# Patient Record
Sex: FEMALE | Race: WHITE | Employment: FULL TIME
[De-identification: names, ages, dates, MRNs, and addresses within clinical notes are randomized per-mention and may not be internally consistent; named-entity substitution may affect disease eponyms.]

---

## 2021-10-14 ENCOUNTER — NURSE TRIAGE (OUTPATIENT)
Dept: OTHER | Facility: CLINIC | Age: 21
End: 2021-10-14

## 2021-10-14 ENCOUNTER — APPOINTMENT (OUTPATIENT)
Dept: GENERAL RADIOLOGY | Age: 21
End: 2021-10-14
Payer: COMMERCIAL

## 2021-10-14 ENCOUNTER — HOSPITAL ENCOUNTER (EMERGENCY)
Age: 21
Discharge: HOME | End: 2021-10-15
Payer: COMMERCIAL

## 2021-10-14 VITALS
DIASTOLIC BLOOD PRESSURE: 88 MMHG | OXYGEN SATURATION: 93 % | RESPIRATION RATE: 22 BRPM | HEART RATE: 109 BPM | TEMPERATURE: 98.3 F | SYSTOLIC BLOOD PRESSURE: 146 MMHG

## 2021-10-14 DIAGNOSIS — S00.83XA FACIAL CONTUSION, INITIAL ENCOUNTER: Primary | ICD-10-CM

## 2021-10-14 PROCEDURE — 99282 EMERGENCY DEPT VISIT SF MDM: CPT

## 2021-10-14 PROCEDURE — 70150 X-RAY EXAM OF FACIAL BONES: CPT

## 2021-10-14 RX ORDER — NORETHINDRONE ACETATE AND ETHINYL ESTRADIOL 1MG-20(21)
1 KIT ORAL DAILY
COMMUNITY

## 2021-10-14 ASSESSMENT — PAIN SCALES - GENERAL: PAINLEVEL_OUTOF10: 2

## 2021-10-14 ASSESSMENT — ENCOUNTER SYMPTOMS
SORE THROAT: 0
ABDOMINAL DISTENTION: 0
SHORTNESS OF BREATH: 0
NAUSEA: 0
ABDOMINAL PAIN: 0
BACK PAIN: 0
PHOTOPHOBIA: 0
EYE PAIN: 0
COLOR CHANGE: 0
RHINORRHEA: 0
EYE DISCHARGE: 0
COUGH: 0
APNEA: 0

## 2021-10-14 ASSESSMENT — PAIN DESCRIPTION - ORIENTATION: ORIENTATION: LEFT

## 2021-10-14 ASSESSMENT — PAIN DESCRIPTION - LOCATION: LOCATION: NOSE

## 2021-10-14 NOTE — Clinical Note
Erin Daniels was seen and treated in our emergency department on 10/14/2021. She may return to work on 10/16/2021. If you have any questions or concerns, please don't hesitate to call.       GUICHO Finn
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
